# Patient Record
Sex: FEMALE | Race: WHITE | NOT HISPANIC OR LATINO | Employment: FULL TIME | URBAN - METROPOLITAN AREA
[De-identification: names, ages, dates, MRNs, and addresses within clinical notes are randomized per-mention and may not be internally consistent; named-entity substitution may affect disease eponyms.]

---

## 2024-07-03 ENCOUNTER — TELEPHONE (OUTPATIENT)
Dept: CARDIOLOGY | Facility: CLINIC | Age: 54
End: 2024-07-03

## 2024-07-03 ENCOUNTER — OFFICE VISIT (OUTPATIENT)
Dept: CARDIOLOGY | Facility: CLINIC | Age: 54
End: 2024-07-03
Payer: COMMERCIAL

## 2024-07-03 VITALS
DIASTOLIC BLOOD PRESSURE: 64 MMHG | WEIGHT: 235 LBS | RESPIRATION RATE: 16 BRPM | HEART RATE: 65 BPM | BODY MASS INDEX: 41.64 KG/M2 | SYSTOLIC BLOOD PRESSURE: 124 MMHG | OXYGEN SATURATION: 98 % | HEIGHT: 63 IN

## 2024-07-03 DIAGNOSIS — I10 ESSENTIAL HYPERTENSION: Primary | ICD-10-CM

## 2024-07-03 DIAGNOSIS — E07.9 THYROID DYSFUNCTION: ICD-10-CM

## 2024-07-03 DIAGNOSIS — R60.0 BILATERAL LOWER EXTREMITY EDEMA: ICD-10-CM

## 2024-07-03 DIAGNOSIS — E78.5 DYSLIPIDEMIA: ICD-10-CM

## 2024-07-03 DIAGNOSIS — G47.33 OSA (OBSTRUCTIVE SLEEP APNEA): ICD-10-CM

## 2024-07-03 PROCEDURE — 3008F BODY MASS INDEX DOCD: CPT | Mod: CPTII,S$GLB,, | Performed by: INTERNAL MEDICINE

## 2024-07-03 PROCEDURE — 99999 PR PBB SHADOW E&M-EST. PATIENT-LVL IV: CPT | Mod: PBBFAC,,, | Performed by: INTERNAL MEDICINE

## 2024-07-03 PROCEDURE — 1159F MED LIST DOCD IN RCRD: CPT | Mod: CPTII,S$GLB,, | Performed by: INTERNAL MEDICINE

## 2024-07-03 PROCEDURE — 1160F RVW MEDS BY RX/DR IN RCRD: CPT | Mod: CPTII,S$GLB,, | Performed by: INTERNAL MEDICINE

## 2024-07-03 PROCEDURE — 3074F SYST BP LT 130 MM HG: CPT | Mod: CPTII,S$GLB,, | Performed by: INTERNAL MEDICINE

## 2024-07-03 PROCEDURE — 99213 OFFICE O/P EST LOW 20 MIN: CPT | Mod: S$GLB,,, | Performed by: INTERNAL MEDICINE

## 2024-07-03 PROCEDURE — 3078F DIAST BP <80 MM HG: CPT | Mod: CPTII,S$GLB,, | Performed by: INTERNAL MEDICINE

## 2024-07-03 RX ORDER — TRIAMTERENE/HYDROCHLOROTHIAZID 37.5-25 MG
1 TABLET ORAL DAILY
Qty: 90 TABLET | Refills: 3 | Status: SHIPPED | OUTPATIENT
Start: 2024-07-03

## 2024-07-03 RX ORDER — DILTIAZEM HYDROCHLORIDE 120 MG/1
120 CAPSULE, COATED, EXTENDED RELEASE ORAL DAILY
Qty: 90 CAPSULE | Refills: 3 | Status: SHIPPED | OUTPATIENT
Start: 2024-07-03 | End: 2025-07-03

## 2024-07-03 RX ORDER — ATORVASTATIN CALCIUM 10 MG/1
10 TABLET, FILM COATED ORAL NIGHTLY
Qty: 90 TABLET | Refills: 3 | Status: SHIPPED | OUTPATIENT
Start: 2024-07-03

## 2024-07-03 RX ORDER — BUPROPION HYDROCHLORIDE 150 MG/1
150 TABLET ORAL EVERY MORNING
COMMUNITY

## 2024-07-03 RX ORDER — SPIRONOLACTONE 25 MG/1
25 TABLET ORAL DAILY
Qty: 90 TABLET | Refills: 3 | Status: SHIPPED | OUTPATIENT
Start: 2024-07-03 | End: 2025-07-03

## 2024-07-03 NOTE — ASSESSMENT & PLAN NOTE
Has been on Lipitor at 10 mg nightly continue to maintain low-fat low-cholesterol diet recheck lipid levels and liver profile as well.

## 2024-07-03 NOTE — PROGRESS NOTES
Subjective:    Patient ID:  Treasure Lindsey is a 54 y.o. female patient here for evaluation Follow-up      History of Present Illness:   Patient is a 54-year-old with history of arterial hypertension dyslipidemia Hashimoto's disease seeking follow-up evaluation.  She was doing remarkably well with no new symptoms of cardiac etiology no chest pain arm neck or jaw pain and no significant shortness of breath with normal physical activity noted.  She has remained fairly active still continuing to maintain reasonable work schedule and no cardiac symptoms noted with normal daily activities.          Review of patient's allergies indicates:   Allergen Reactions    Other Rash     Vicryl sutures       Past Medical History:   Diagnosis Date    Atopic dermatitis     Hashimoto's disease     Hyperlipidemia     Hypertension     Inflammatory bowel diseases (IBD)     Migraine headache without aura     CARMINA (obstructive sleep apnea)      Past Surgical History:   Procedure Laterality Date    BREAST SURGERY        reduction     SECTION      COLONOSCOPY      repeat in 10    HYSTERECTOMY  2005    TAHRSO for adhesions    OOPHORECTOMY  2009    LSO    SHOULDER SURGERY      2008 bone spur     Social History     Tobacco Use    Smoking status: Never    Smokeless tobacco: Never   Substance Use Topics    Alcohol use: Yes     Comment: occasionally    Drug use: No        Review of Systems:    As noted in HPI in addition      REVIEW OF SYSTEMS  CARDIOVASCULAR: No recent chest pain, palpitations, arm, neck, or jaw pain  RESPIRATORY: No recent fever, cough chills, SOB or congestion  : No blood in the urine  GI: No Nausea, vomiting, constipation, diarrhea, blood, or reflux.  MUSCULOSKELETAL: No myalgias  NEURO: No lightheadedness or dizziness  EYES: No Double vision, blurry, vision or headache              Objective        Vitals:    24 1327   BP: 124/64   Pulse: 65   Resp: 16       LIPIDS - LAST 2   Lab Results   Component  Value Date    CHOL 279 (H) 01/03/2023    CHOL 156 10/14/2022    HDL 64 01/03/2023    HDL 60 10/14/2022    LDLCALC 187.6 (H) 01/03/2023    LDLCALC 81.0 10/14/2022    TRIG 137 01/03/2023    TRIG 75 10/14/2022    CHOLHDL 22.9 01/03/2023    CHOLHDL 38.5 10/14/2022       CBC - LAST 2  Lab Results   Component Value Date    WBC 8.51 05/06/2023    WBC 7.13 01/04/2023    RBC 5.21 05/06/2023    RBC 4.64 01/04/2023    HGB 13.9 05/06/2023    HGB 12.7 01/04/2023    HCT 43.1 05/06/2023    HCT 39.4 01/04/2023    MCV 83 05/06/2023    MCV 85 01/04/2023    MCH 26.7 (L) 05/06/2023    MCH 27.4 01/04/2023    MCHC 32.3 05/06/2023    MCHC 32.2 01/04/2023    RDW 14.2 05/06/2023    RDW 14.3 01/04/2023     05/06/2023     01/04/2023    MPV 9.4 05/06/2023    MPV 9.4 01/04/2023    GRAN 6.8 05/06/2023    GRAN 79.5 (H) 05/06/2023    LYMPH 1.0 05/06/2023    LYMPH 12.0 (L) 05/06/2023    MONO 0.6 05/06/2023    MONO 6.5 05/06/2023    BASO 0.03 05/06/2023    BASO 0.04 01/04/2023    NRBC 0 05/06/2023    NRBC 0 01/04/2023       CHEMISTRY & LIVER FUNCTION - LAST 2  Lab Results   Component Value Date     05/27/2023     05/06/2023    K 3.8 05/27/2023    K 3.2 (L) 05/06/2023     05/27/2023    CL 98 05/06/2023    CO2 30 (H) 05/27/2023    CO2 32 (H) 05/06/2023    ANIONGAP 9 05/27/2023    ANIONGAP 8 05/06/2023    BUN 16 05/27/2023    BUN 11 05/06/2023    CREATININE 1.0 05/27/2023    CREATININE 0.9 05/06/2023     05/27/2023     (H) 05/06/2023    CALCIUM 9.9 05/27/2023    CALCIUM 10.1 05/06/2023    MG 1.9 05/27/2023    MG 2.0 12/11/2021    ALBUMIN 4.2 05/06/2023    ALBUMIN 4.4 01/03/2023    PROT 7.4 05/06/2023    PROT 7.4 01/03/2023    ALKPHOS 86 05/06/2023    ALKPHOS 96 01/03/2023    ALT 22 05/06/2023    ALT 23 01/03/2023    AST 20 05/06/2023    AST 19 01/03/2023    BILITOT 0.6 05/06/2023    BILITOT 0.6 01/03/2023        CARDIAC PROFILE - LAST 2  Lab Results   Component Value Date    BNP 11 05/06/2023         COAGULATION - LAST 2  Lab Results   Component Value Date    INR 1.0 01/03/2023       ENDOCRINE & PSA - LAST 2  Lab Results   Component Value Date    HGBA1C 5.9 10/14/2022    HGBA1C 5.6 12/11/2021    TSH 1.660 05/06/2023    TSH 2.040 01/03/2023        ECHOCARDIOGRAM RESULTS  Results for orders placed during the hospital encounter of 06/14/23    Echo    Interpretation Summary  · The left ventricle is normal in size with concentric remodeling and normal systolic function.  · The estimated ejection fraction is 70%.  · Normal left ventricular diastolic function.  · Normal right ventricular size with normal right ventricular systolic function.  · Normal central venous pressure (3 mmHg).  · The estimated PA systolic pressure is 23 mmHg.      CURRENT/PREVIOUS VISIT EKG  Results for orders placed or performed during the hospital encounter of 01/03/23   ECG 12 lead    Collection Time: 01/03/23  7:18 PM    Narrative    Test Reason : I63.9,    Vent. Rate : 060 BPM     Atrial Rate : 060 BPM     P-R Int : 156 ms          QRS Dur : 090 ms      QT Int : 462 ms       P-R-T Axes : 049 025 017 degrees     QTc Int : 462 ms    Normal sinus rhythm  Normal ECG  ECG not diagnostic for Acute Coronary Syndrome; consider clinical findings  No previous ECGs available  Confirmed by Justice Miller MD ([id number]) on 1/4/2023 6:59:36 PM    Referred By: KEEGAN   SELF           Confirmed By:Justice Miller MD     No valid procedures specified.   No results found for this or any previous visit.    No valid procedures specified.    PHYSICAL EXAM  CONSTITUTIONAL: Well built, well nourished in no apparent distress  NECK: no carotid bruit, no JVD  LUNGS: CTA  CHEST WALL: no tenderness  HEART: regular rate and rhythm, S1, S2 normal, soft systolic murmurs noted in the left upper sternal border.    ABDOMEN: soft, non-tender; bowel sounds normal; no masses,  no organomegaly  EXTREMITIES: Extremities normal, no edema, no calf tenderness noted  NEURO: AAO  X 3    I HAVE REVIEWED :    The vital signs, nurses notes, and all the pertinent radiology and labs.        Current Outpatient Medications   Medication Instructions    acetaminophen (TYLENOL) 500 mg, Oral, Every 6 hours PRN    atorvastatin (LIPITOR) 10 mg, Oral, Nightly    biotin 10 mg Tab 1 tablet, Oral, Daily    buPROPion (WELLBUTRIN XL) 150 mg, Oral, Every morning    cholecalciferol, vitamin D3, 125 mcg (5,000 unit) Tab 1 tablet, Oral, Every Tues, Thurs, Sat    clonazePAM (KLONOPIN) 0.5 mg, Oral, 2 times daily PRN    crisaborole (EUCRISA) 2 % Oint AAA BID PRN RASH.  OK to use continuously.  May cause stinging sensation with first applications    diltiaZEM (CARDIZEM CD) 120 mg, Oral, Daily    dupilumab (DUPIXENT SYRINGE) 300 mg/2 mL Syrg Inject 2ml sc every 2 wks    estradioL (ESTRACE) 1 mg, Oral, Daily    gabapentin (NEURONTIN) 100 mg, Oral, 3 times daily    ibuprofen (ADVIL,MOTRIN) 200 MG tablet Oral, Every 6 hours PRN    levothyroxine (TIROSINT) 50 mcg, Oral, Daily    MAGNESIUM GLYCINATE ORAL 400 mg, Oral, Daily    NURTEC 75 mg odt Oral, As needed (PRN)    potassium chloride SA (K-DUR,KLOR-CON M) 10 MEQ tablet 10 mEq, Oral, Daily    spironolactone (ALDACTONE) 25 mg, Oral, Daily    tazarotene (ARAZLO) 0.045 % Lotn 1 application , Topical (Top), Nightly, apply pea sized amt to face every other night x 2 wks then nightly    traMADoL (ULTRAM) 50 mg, Oral, Every 6 hours PRN    triamterene-hydrochlorothiazide 37.5-25 mg (MAXZIDE-25) 37.5-25 mg per tablet 1 tablet, Oral, Daily    TRINTELLIX 10 mg Tab No dose, route, or frequency recorded.    vitamins  A,C,E-zinc-copper (PRESERVISION AREDS) [phone] unit-mg-unit Cap 1 capsule, Oral, 2 times daily          Assessment & Plan     1. Essential hypertension  Assessment & Plan:  Blood pressure is well controlled at 124/64 mm Hg I have encouraged her to continue on present regimen to include diltiazem  mg a day.  Encouraged to continue on triamterene  hydrochlorothiazide 37.5/25 mg daily.  Maintain on low-salt diet and Aldactone 25 mg once daily.  Recheck her labs in the near future she she has a primary care evaluation next month and encouraged her to get a full panel at that time      2. Dyslipidemia  Assessment & Plan:  Has been on Lipitor at 10 mg nightly continue to maintain low-fat low-cholesterol diet recheck lipid levels and liver profile as well.      3. Thyroid dysfunction  Assessment & Plan:  History of thyroid dysfunction has been on levothyroxine 50 mcg a day continue the same      4. CARMINA (obstructive sleep apnea)  Assessment & Plan:  Clinically stable.  Continue on device usage      5. Bilateral lower extremity edema  -     spironolactone (ALDACTONE) 25 MG tablet; Take 1 tablet (25 mg total) by mouth once daily.  Dispense: 90 tablet; Refill: 3    Other orders  -     atorvastatin (LIPITOR) 10 MG tablet; Take 1 tablet (10 mg total) by mouth every evening.  Dispense: 90 tablet; Refill: 3  -     diltiaZEM (CARDIZEM CD) 120 MG Cp24; Take 1 capsule (120 mg total) by mouth once daily.  Dispense: 90 capsule; Refill: 3  -     triamterene-hydrochlorothiazide 37.5-25 mg (MAXZIDE-25) 37.5-25 mg per tablet; Take 1 tablet by mouth once daily.  Dispense: 90 tablet; Refill: 3          No follow-ups on file.

## 2024-07-03 NOTE — ASSESSMENT & PLAN NOTE
Blood pressure is well controlled at 124/64 mm Hg I have encouraged her to continue on present regimen to include diltiazem  mg a day.  Encouraged to continue on triamterene hydrochlorothiazide 37.5/25 mg daily.  Maintain on low-salt diet and Aldactone 25 mg once daily.  Recheck her labs in the near future she she has a primary care evaluation next month and encouraged her to get a full panel at that time

## 2024-07-03 NOTE — TELEPHONE ENCOUNTER
Pharmacy checking to make sure she should be on both spironolactone and triam/hctz. Reviewed his note, he has her on both and labs to be done next month.

## 2024-07-03 NOTE — TELEPHONE ENCOUNTER
----- Message from Fred Meehan sent at 7/3/2024  2:48 PM CDT -----  Regarding: pharmacy  Contact: Pooja Singer  Type:  Pharmacy Calling to Clarify an RX    Name of Caller:  formerly [facility] - [place], [place] - [address]  [address]  [place] [address]  Phone: [phone] Fax: [phone]  Pharmacy Name:  Prescription Name:spironolactone (ALDACTONE) 25 MG tablet  What do they need to clarify?: please call  Best Call Back Number:  Additional Information:

## 2025-03-27 ENCOUNTER — OFFICE VISIT (OUTPATIENT)
Dept: OBSTETRICS AND GYNECOLOGY | Facility: CLINIC | Age: 55
End: 2025-03-27
Payer: COMMERCIAL

## 2025-03-27 VITALS
HEIGHT: 63 IN | SYSTOLIC BLOOD PRESSURE: 118 MMHG | BODY MASS INDEX: 34.84 KG/M2 | DIASTOLIC BLOOD PRESSURE: 64 MMHG | WEIGHT: 196.63 LBS

## 2025-03-27 DIAGNOSIS — Z01.419 ROUTINE GYNECOLOGICAL EXAMINATION: Primary | ICD-10-CM

## 2025-03-27 DIAGNOSIS — Z90.721 S/P HYSTERECTOMY WITH OOPHORECTOMY: ICD-10-CM

## 2025-03-27 DIAGNOSIS — Z90.710 S/P HYSTERECTOMY WITH OOPHORECTOMY: ICD-10-CM

## 2025-03-27 DIAGNOSIS — Z12.31 ENCOUNTER FOR SCREENING MAMMOGRAM FOR MALIGNANT NEOPLASM OF BREAST: ICD-10-CM

## 2025-03-27 DIAGNOSIS — N90.60 LABIAL HYPERTROPHY: ICD-10-CM

## 2025-03-27 DIAGNOSIS — Z79.890 HORMONE REPLACEMENT THERAPY (HRT): ICD-10-CM

## 2025-03-27 PROCEDURE — 99999 PR PBB SHADOW E&M-EST. PATIENT-LVL V: CPT | Mod: PBBFAC,,, | Performed by: OBSTETRICS & GYNECOLOGY

## 2025-03-27 PROCEDURE — 1159F MED LIST DOCD IN RCRD: CPT | Mod: CPTII,S$GLB,, | Performed by: OBSTETRICS & GYNECOLOGY

## 2025-03-27 PROCEDURE — 3074F SYST BP LT 130 MM HG: CPT | Mod: CPTII,S$GLB,, | Performed by: OBSTETRICS & GYNECOLOGY

## 2025-03-27 PROCEDURE — 99396 PREV VISIT EST AGE 40-64: CPT | Mod: S$GLB,,, | Performed by: OBSTETRICS & GYNECOLOGY

## 2025-03-27 PROCEDURE — 3078F DIAST BP <80 MM HG: CPT | Mod: CPTII,S$GLB,, | Performed by: OBSTETRICS & GYNECOLOGY

## 2025-03-27 PROCEDURE — 3008F BODY MASS INDEX DOCD: CPT | Mod: CPTII,S$GLB,, | Performed by: OBSTETRICS & GYNECOLOGY

## 2025-03-27 RX ORDER — ESTRADIOL 1 MG/1
1 TABLET ORAL DAILY
Qty: 90 TABLET | Refills: 3 | Status: SHIPPED | OUTPATIENT
Start: 2025-03-27 | End: 2026-03-27

## 2025-03-27 NOTE — PROGRESS NOTES
Chief Complaint   Patient presents with    Well Woman     History of Present Illness: Treasure Lindsey is a 54 y.o. female that presents today 3/27/2025 for well gyn visit.    She complains today of right labia enlargement that hurts, pinches, bleeds at times and worsening over last years.     Past Medical History:   Diagnosis Date    Atopic dermatitis     Hashimoto's disease     Hyperlipidemia     Hypertension     Inflammatory bowel diseases (IBD)     Migraine headache without aura     CARMINA (obstructive sleep apnea)        Past Surgical History:   Procedure Laterality Date    BREAST SURGERY        reduction     SECTION      COLONOSCOPY      repeat in 10    HYSTERECTOMY  2005    TAHRSO for adhesions    OOPHORECTOMY  2009    LSO    SHOULDER SURGERY      2008 bone spur       Medications Prior to Visit[1]    Review of patient's allergies indicates:   Allergen Reactions    Other Rash     Vicryl sutures       Family History   Problem Relation Name Age of Onset    Cancer Maternal Grandmother          breast    Hypertension Mother      Breast cancer Neg Hx      Ovarian cancer Neg Hx         Social History     Tobacco Use    Smoking status: Never    Smokeless tobacco: Never   Substance Use Topics    Alcohol use: Yes     Comment: occasionally       Social History     Substance and Sexual Activity   Sexual Activity Yes    Partners: Male       OB History    Para Term  AB Living   1 1       SAB IAB Ectopic Multiple Live Births             # Outcome Date GA Lbr Anshul/2nd Weight Sex Type Anes PTL Lv   1 Para                Review of Symptoms:  GENERAL: Denies weight gain or weight loss. Feeling well overall.   SKIN: Denies rash or lesions.   HEAD: Denies head injury or headache.   NODES: Denies enlarged lymph nodes.   CHEST: Denies chest pain or shortness of breath.   CARDIOVASCULAR: Denies palpitations or left sided chest pain.   ABDOMEN: No abdominal pain, constipation, diarrhea, nausea, vomiting or  "rectal bleeding.   URINARY: No frequency, dysuria, hematuria, or burning on urination.  HEMATOLOGIC: No easy bruisability or excessive bleeding.   MUSCULOSKELETAL: Denies joint pain or swelling.     /64   Ht 5' 3" (1.6 m)   Wt 89.2 kg (196 lb 10.4 oz)     Body mass index is 34.84 kg/m².    Physical Exam:  APPEARANCE: Well nourished, well developed, in no acute distress.  SKIN: Normal skin turgor, no lesions.  NECK: Neck symmetric without masses   RESPIRATORY: Normal respiratory effort with no retractions or use of accessory muscles  CARDIOVASCULAR: Peripheral vascular system with no swelling no varicosities and palpation of pulses normal  LYMPHATIC: No enlargements of the lymph nodes noted in the neck, axillae, or groin  ABDOMEN: Soft. No tenderness or masses. No hepatosplenomegaly. No hernias.  BREASTS: Symmetrical, no skin changes or visible lesions. No palpable masses, nipple discharge or adenopathy bilaterally.  PELVIC: Normal external female genitalia with very enlarged RIGHT minora   Normal hair distribution. Adequate perineal body, normal urethral meatus. Urethra with no masses.  Bladder nontender. Vagina moist and well rugated without lesions or discharge. No significant cystocele or rectocele.  Adnexa without masses or tenderness. Urethra and bladder normal.   EXTREMITIES: No clubbing cyanosis or edema.    ASSESSMENT/PLAN:  Routine gynecological examination    Encounter for screening mammogram for malignant neoplasm of breast  -     Mammo Digital Screening Bilat w/ Rober (XPD); Future; Expected date: 03/27/2025    Labial hypertrophy    S/P hysterectomy with oophorectomy    Hormone replacement therapy (HRT)  -     estradioL (ESTRACE) 1 MG tablet; Take 1 tablet (1 mg total) by mouth once daily.  Dispense: 90 tablet; Refill: 3          Patient was counseled today on Pap guidelines. We discussed the discontinuing the pap smear after hysterectomy except in certain high risk cases.  We discussed the need " for pelvic exams.   We discussed STD screening if at high risk for an STD.  We discussed breast cancer screening with mammograms every other year after the age of 40 and annually after the age of 50.    We discussed colon cancer screening.   Osteoporosis screening with the Dexa Bone Scan discussed when indicated.   She will see her PCP for other health maintenance.       FOLLOW-UP:prn             [1]   Outpatient Medications Prior to Visit   Medication Sig Dispense Refill    acetaminophen (TYLENOL) 500 MG tablet Take 500 mg by mouth every 6 (six) hours as needed.      atorvastatin (LIPITOR) 10 MG tablet Take 1 tablet (10 mg total) by mouth every evening. 90 tablet 3    biotin 10 mg Tab Take 1 tablet by mouth once daily.      buPROPion (WELLBUTRIN XL) 150 MG TB24 tablet Take 150 mg by mouth every morning.      cholecalciferol, vitamin D3, 125 mcg (5,000 unit) Tab Take 1 tablet by mouth every Tues, Thurs, Sat.      clonazePAM (KLONOPIN) 0.5 MG tablet Take 0.5 mg by mouth 2 (two) times daily as needed.      crisaborole (EUCRISA) 2 % Oint AAA BID PRN RASH.  OK to use continuously.  May cause stinging sensation with first applications 60 g 11    diltiaZEM (CARDIZEM CD) 120 MG Cp24 Take 1 capsule (120 mg total) by mouth once daily. 90 capsule 3    dupilumab (DUPIXENT SYRINGE) 300 mg/2 mL Syrg Inject 2ml sc every 2 wks 12 mL 3    gabapentin (NEURONTIN) 100 MG capsule Take 100 mg by mouth 3 (three) times daily.      ibuprofen (ADVIL,MOTRIN) 200 MG tablet Take by mouth every 6 (six) hours as needed.      levothyroxine (TIROSINT) 50 mcg Cap Take 50 mcg by mouth once daily.       MAGNESIUM GLYCINATE ORAL Take 400 mg by mouth once daily.      NURTEC 75 mg odt Take by mouth as needed.      potassium chloride SA (K-DUR,KLOR-CON M) 10 MEQ tablet Take 1 tablet (10 mEq total) by mouth once daily. 90 tablet 3    spironolactone (ALDACTONE) 25 MG tablet Take 1 tablet (25 mg total) by mouth once daily. 90 tablet 3    tazarotene (ARAZLO)  0.045 % Lotn Apply 1 application  topically every evening. apply pea sized amt to face every other night x 2 wks then nightly 45 g 11    triamterene-hydrochlorothiazide 37.5-25 mg (MAXZIDE-25) 37.5-25 mg per tablet Take 1 tablet by mouth once daily. 90 tablet 3    vitamins  A,C,E-zinc-copper (PRESERVISION AREDS) [phone] unit-mg-unit Cap Take 1 capsule by mouth 2 (two) times a day.      estradioL (ESTRACE) 1 MG tablet Take 1 tablet (1 mg total) by mouth once daily. 90 tablet 3    traMADoL (ULTRAM) 50 mg tablet Take 1 tablet (50 mg total) by mouth every 6 (six) hours as needed for Pain. (Patient not taking: Reported on 3/27/2025) 12 tablet 0    TRINTELLIX 10 mg Tab        No facility-administered medications prior to visit.

## 2025-04-06 ENCOUNTER — PATIENT MESSAGE (OUTPATIENT)
Dept: OBSTETRICS AND GYNECOLOGY | Facility: CLINIC | Age: 55
End: 2025-04-06
Payer: COMMERCIAL

## 2025-05-12 DIAGNOSIS — R60.0 BILATERAL LOWER EXTREMITY EDEMA: ICD-10-CM

## 2025-05-13 RX ORDER — SPIRONOLACTONE 25 MG/1
25 TABLET ORAL DAILY
Qty: 90 TABLET | Refills: 3 | Status: SHIPPED | OUTPATIENT
Start: 2025-05-13 | End: 2026-05-13

## 2025-07-03 ENCOUNTER — OFFICE VISIT (OUTPATIENT)
Dept: CARDIOLOGY | Facility: CLINIC | Age: 55
End: 2025-07-03
Payer: COMMERCIAL

## 2025-07-03 VITALS
HEART RATE: 70 BPM | WEIGHT: 171.38 LBS | OXYGEN SATURATION: 97 % | BODY MASS INDEX: 30.36 KG/M2 | SYSTOLIC BLOOD PRESSURE: 112 MMHG | DIASTOLIC BLOOD PRESSURE: 78 MMHG

## 2025-07-03 DIAGNOSIS — E78.5 DYSLIPIDEMIA: ICD-10-CM

## 2025-07-03 DIAGNOSIS — E07.9 THYROID DYSFUNCTION: ICD-10-CM

## 2025-07-03 DIAGNOSIS — I10 ESSENTIAL HYPERTENSION: Primary | ICD-10-CM

## 2025-07-03 PROCEDURE — 3074F SYST BP LT 130 MM HG: CPT | Mod: CPTII,S$GLB,, | Performed by: INTERNAL MEDICINE

## 2025-07-03 PROCEDURE — 99999 PR PBB SHADOW E&M-EST. PATIENT-LVL IV: CPT | Mod: PBBFAC,,, | Performed by: INTERNAL MEDICINE

## 2025-07-03 PROCEDURE — 3078F DIAST BP <80 MM HG: CPT | Mod: CPTII,S$GLB,, | Performed by: INTERNAL MEDICINE

## 2025-07-03 PROCEDURE — 99214 OFFICE O/P EST MOD 30 MIN: CPT | Mod: S$GLB,,, | Performed by: INTERNAL MEDICINE

## 2025-07-03 PROCEDURE — 1160F RVW MEDS BY RX/DR IN RCRD: CPT | Mod: CPTII,S$GLB,, | Performed by: INTERNAL MEDICINE

## 2025-07-03 PROCEDURE — 1159F MED LIST DOCD IN RCRD: CPT | Mod: CPTII,S$GLB,, | Performed by: INTERNAL MEDICINE

## 2025-07-03 PROCEDURE — 3008F BODY MASS INDEX DOCD: CPT | Mod: CPTII,S$GLB,, | Performed by: INTERNAL MEDICINE

## 2025-07-03 NOTE — ASSESSMENT & PLAN NOTE
Continue on low dose of Lipitor maintain low-fat low-cholesterol diet as she is doing continue diet and exercise and recheck all her labs including TSH

## 2025-07-03 NOTE — PROGRESS NOTES
Subjective:    Patient ID:  Treasure Lindsey is a 55 y.o. female patient here for evaluation Follow-up      History of Present Illness:     Patient is a 55-year-old with history of arterial hypertension dyslipidemia Hashimoto's thyroiditis in the past is seeking follow-up evaluation.  She has lost about 87 lb since highest and with careful dietary intake.  She is doing remarkably well she is walking on a regular basis no occurrence of any angina shortness of breath PND orthopnea noted no swelling in the lower extremities.  And no significant palpitations noted.    She is maintaining on statin therapy along with Maxzide 37.5/25 mg daily and she takes spironolactone every other day and diltiazem is down to 120 mg daily.  She is anxious to get off some of this has medications        Review of patient's allergies indicates:   Allergen Reactions    Other Rash     Vicryl sutures       Past Medical History:   Diagnosis Date    Atopic dermatitis     Hashimoto's disease     Hyperlipidemia     Hypertension     Inflammatory bowel diseases (IBD)     Migraine headache without aura     CARMINA (obstructive sleep apnea)      Past Surgical History:   Procedure Laterality Date    BREAST SURGERY        reduction     SECTION      COLONOSCOPY      repeat in 10    HYSTERECTOMY  2005    TAHRSO for adhesions    OOPHORECTOMY  2009    LSO    SHOULDER SURGERY      2008 bone spur     Social History[1]     Review of Systems:    As noted in HPI in addition      REVIEW OF SYSTEMS  CARDIOVASCULAR: No recent chest pain, palpitations, arm, neck, or jaw pain  RESPIRATORY: No recent fever, cough chills, SOB or congestion  : No blood in the urine  GI: No Nausea, vomiting, constipation, diarrhea, blood, or reflux.  MUSCULOSKELETAL: No myalgias  NEURO: No lightheadedness or dizziness  EYES: No Double vision, blurry, vision or headache   Sleep apnea has improved significantly has not knee has a need to use CPAP machine in 2 3 months            Objective        Vitals:    07/03/25 0841   BP: 112/78   Pulse: 70       LIPIDS - LAST 2   Lab Results   Component Value Date    CHOL 279 (H) 01/03/2023    CHOL 156 10/14/2022    HDL 64 01/03/2023    HDL 60 10/14/2022    LDLCALC 187.6 (H) 01/03/2023    LDLCALC 81.0 10/14/2022    TRIG 137 01/03/2023    TRIG 75 10/14/2022    CHOLHDL 22.9 01/03/2023    CHOLHDL 38.5 10/14/2022       CBC - LAST 2  Lab Results   Component Value Date    WBC 8.51 05/06/2023    WBC 7.13 01/04/2023    RBC 5.21 05/06/2023    RBC 4.64 01/04/2023    HGB 13.9 05/06/2023    HGB 12.7 01/04/2023    HCT 43.1 05/06/2023    HCT 39.4 01/04/2023    MCV 83 05/06/2023    MCV 85 01/04/2023    MCH 26.7 (L) 05/06/2023    MCH 27.4 01/04/2023    MCHC 32.3 05/06/2023    MCHC 32.2 01/04/2023    RDW 14.2 05/06/2023    RDW 14.3 01/04/2023     05/06/2023     01/04/2023    MPV 9.4 05/06/2023    MPV 9.4 01/04/2023    GRAN 6.8 05/06/2023    GRAN 79.5 (H) 05/06/2023    LYMPH 1.0 05/06/2023    LYMPH 12.0 (L) 05/06/2023    MONO 0.6 05/06/2023    MONO 6.5 05/06/2023    BASO 0.03 05/06/2023    BASO 0.04 01/04/2023    NRBC 0 05/06/2023    NRBC 0 01/04/2023       CHEMISTRY & LIVER FUNCTION - LAST 2  Lab Results   Component Value Date     05/27/2023     05/06/2023    K 3.8 05/27/2023    K 3.2 (L) 05/06/2023     05/27/2023    CL 98 05/06/2023    CO2 30 (H) 05/27/2023    CO2 32 (H) 05/06/2023    ANIONGAP 9 05/27/2023    ANIONGAP 8 05/06/2023    BUN 16 05/27/2023    BUN 11 05/06/2023    CREATININE 1.0 05/27/2023    CREATININE 0.9 05/06/2023     05/27/2023     (H) 05/06/2023    CALCIUM 9.9 05/27/2023    CALCIUM 10.1 05/06/2023    MG 1.9 05/27/2023    MG 2.0 12/11/2021    ALBUMIN 4.2 05/06/2023    ALBUMIN 4.4 01/03/2023    PROT 7.4 05/06/2023    PROT 7.4 01/03/2023    ALKPHOS 86 05/06/2023    ALKPHOS 96 01/03/2023    ALT 22 05/06/2023    ALT 23 01/03/2023    AST 20 05/06/2023    AST 19 01/03/2023    BILITOT 0.6 05/06/2023     BILITOT 0.6 01/03/2023        CARDIAC PROFILE - LAST 2  Lab Results   Component Value Date    BNP 11 05/06/2023        COAGULATION - LAST 2  Lab Results   Component Value Date    INR 1.0 01/03/2023       ENDOCRINE & PSA - LAST 2  Lab Results   Component Value Date    HGBA1C 5.9 10/14/2022    HGBA1C 5.6 12/11/2021    TSH 1.660 05/06/2023    TSH 2.040 01/03/2023        ECHOCARDIOGRAM RESULTS  Results for orders placed during the hospital encounter of 06/14/23    Echo    Interpretation Summary  · The left ventricle is normal in size with concentric remodeling and normal systolic function.  · The estimated ejection fraction is 70%.  · Normal left ventricular diastolic function.  · Normal right ventricular size with normal right ventricular systolic function.  · Normal central venous pressure (3 mmHg).  · The estimated PA systolic pressure is 23 mmHg.      CURRENT/PREVIOUS VISIT EKG  Results for orders placed or performed during the hospital encounter of 01/03/23   ECG 12 lead    Collection Time: 01/03/23  7:18 PM    Narrative    Test Reason : I63.9,    Vent. Rate : 060 BPM     Atrial Rate : 060 BPM     P-R Int : 156 ms          QRS Dur : 090 ms      QT Int : 462 ms       P-R-T Axes : 049 025 017 degrees     QTc Int : 462 ms    Normal sinus rhythm  Normal ECG  ECG not diagnostic for Acute Coronary Syndrome; consider clinical findings  No previous ECGs available  Confirmed by Justice Miller MD ([id number]) on 1/4/2023 6:59:36 PM    Referred By: [email or web address]   SELF           Confirmed By:Justice Miller MD     No valid procedures specified.   No results found for this or any previous visit.    No valid procedures specified.    PHYSICAL EXAM  CONSTITUTIONAL: Well built, well nourished in no apparent distress  NECK: no carotid bruit, no JVD  LUNGS: CTA  CHEST WALL: no tenderness  HEART: regular rate and rhythm, S1, S2 normal, no murmur, click, rub or gallop   ABDOMEN: soft, non-tender; bowel sounds normal; no masses,  no  organomegaly  EXTREMITIES: Extremities normal, no edema, no calf tenderness noted  NEURO: AAO X 3    I HAVE REVIEWED :    The vital signs, nurses notes, and all the pertinent radiology and labs.    07/03/2025 EKG shows sinus rhythm within normal limits QTC of 435 milliseconds and CT interval of 136 milliseconds  Last TSH was 1.51  Free T4 was 1.1    Current Outpatient Medications   Medication Instructions    acetaminophen (TYLENOL) 500 mg, Every 6 hours PRN    atorvastatin (LIPITOR) 10 mg, Oral, Nightly    biotin 10 mg Tab 1 tablet, Daily    buPROPion (WELLBUTRIN XL) 150 mg, Every morning    cholecalciferol, vitamin D3, 125 mcg (5,000 unit) Tab 1 tablet, Every Tues, Thurs, Sat    clonazePAM (KLONOPIN) 0.5 mg, 2 times daily PRN    crisaborole (EUCRISA) 2 % Oint AAA BID PRN RASH.  OK to use continuously.  May cause stinging sensation with first applications    diltiaZEM (CARDIZEM CD) 120 mg, Oral, Daily    dupilumab (DUPIXENT SYRINGE) 300 mg/2 mL Syrg Inject 2ml sc every 2 wks    estradioL (ESTRACE) 1 mg, Oral, Daily    gabapentin (NEURONTIN) 100 mg, 3 times daily    ibuprofen (ADVIL,MOTRIN) 200 MG tablet Every 6 hours PRN    levothyroxine (TIROSINT) 50 mcg, Daily    MAGNESIUM GLYCINATE ORAL 400 mg, Daily    NURTEC 75 mg odt As needed (PRN)    potassium chloride SA (K-DUR,KLOR-CON M) 10 MEQ tablet 10 mEq, Oral, Daily    spironolactone (ALDACTONE) 25 mg, Oral, Daily    tazarotene (ARAZLO) 0.045 % Lotn 1 application , Topical (Top), Nightly, apply pea sized amt to face every other night x 2 wks then nightly    traMADoL (ULTRAM) 50 mg, Oral, Every 6 hours PRN    triamterene-hydrochlorothiazide 37.5-25 mg (MAXZIDE-25) 37.5-25 mg per tablet 1 tablet, Oral, Daily    vitamins  A,C,E-zinc-copper (PRESERVISION AREDS) [phone] unit-mg-unit Cap 1 capsule, 2 times daily          Assessment & Plan     1. Essential hypertension  Assessment & Plan:  Essential hypertension blood pressure is really well controlled at 112/78 mm  Hg   recommend the following   1. Tapered down on diltiazem to every other day for a week and then stop it, watch for any palpitations or elevations of blood pressure  2. Continue to monitor blood pressure periodically and if it remains good will taper down on the diuretic triamterene hydrochlorothiazide mg/to every other day  3. Continue on magnesium supplements  4. Maintain potassium along with triamterene hydrochlorothiazide any time she takes this has intake the other potassium supplements      2. Dyslipidemia  Assessment & Plan:  Continue on low dose of Lipitor maintain low-fat low-cholesterol diet as she is doing continue diet and exercise and recheck all her labs including TSH      3. Thyroid dysfunction  Assessment & Plan:  She is currently being followed by endocrinologist            Follow up in about 1 year (around 7/3/2026).            [1]   Social History  Tobacco Use    Smoking status: Never    Smokeless tobacco: Never   Substance Use Topics    Alcohol use: Yes     Comment: occasionally    Drug use: No

## 2025-07-03 NOTE — ASSESSMENT & PLAN NOTE
Essential hypertension blood pressure is really well controlled at 112/78 mm Hg   recommend the following   1. Tapered down on diltiazem to every other day for a week and then stop it, watch for any palpitations or elevations of blood pressure  2. Continue to monitor blood pressure periodically and if it remains good will taper down on the diuretic triamterene hydrochlorothiazide mg/to every other day  3. Continue on magnesium supplements  4. Maintain potassium along with triamterene hydrochlorothiazide any time she takes this has intake the other potassium supplements

## 2025-07-14 RX ORDER — TRIAMTERENE AND HYDROCHLOROTHIAZIDE 37.5; 25 MG/1; MG/1
1 TABLET ORAL DAILY
Qty: 90 TABLET | Refills: 3 | Status: SHIPPED | OUTPATIENT
Start: 2025-07-14

## 2025-07-14 RX ORDER — ATORVASTATIN CALCIUM 10 MG/1
10 TABLET, FILM COATED ORAL NIGHTLY
Qty: 90 TABLET | Refills: 3 | Status: SHIPPED | OUTPATIENT
Start: 2025-07-14

## 2025-07-14 RX ORDER — DILTIAZEM HYDROCHLORIDE 120 MG/1
120 CAPSULE, COATED, EXTENDED RELEASE ORAL DAILY
Qty: 90 CAPSULE | Refills: 3 | Status: SHIPPED | OUTPATIENT
Start: 2025-07-14 | End: 2026-07-14